# Patient Record
Sex: MALE | Employment: OTHER | URBAN - METROPOLITAN AREA
[De-identification: names, ages, dates, MRNs, and addresses within clinical notes are randomized per-mention and may not be internally consistent; named-entity substitution may affect disease eponyms.]

---

## 2021-12-19 ENCOUNTER — ANESTHESIA EVENT (OUTPATIENT)
Dept: ENDOSCOPY | Age: 76
End: 2021-12-19
Payer: MEDICARE

## 2021-12-23 ENCOUNTER — ANESTHESIA (OUTPATIENT)
Dept: ENDOSCOPY | Age: 76
End: 2021-12-23
Payer: MEDICARE

## 2021-12-23 ENCOUNTER — HOSPITAL ENCOUNTER (OUTPATIENT)
Age: 76
Setting detail: OUTPATIENT SURGERY
Discharge: HOME | End: 2021-12-23
Attending: STUDENT IN AN ORGANIZED HEALTH CARE EDUCATION/TRAINING PROGRAM | Admitting: STUDENT IN AN ORGANIZED HEALTH CARE EDUCATION/TRAINING PROGRAM
Payer: MEDICARE

## 2021-12-23 VITALS
RESPIRATION RATE: 16 BRPM | HEART RATE: 63 BPM | TEMPERATURE: 98 F | SYSTOLIC BLOOD PRESSURE: 100 MMHG | BODY MASS INDEX: 22.21 KG/M2 | OXYGEN SATURATION: 98 % | HEIGHT: 72 IN | WEIGHT: 164 LBS | DIASTOLIC BLOOD PRESSURE: 55 MMHG

## 2021-12-23 LAB
GLUCOSE BLD STRIP.AUTO-MCNC: 278 MG/DL (ref 65–100)
SERVICE CMNT-IMP: ABNORMAL

## 2021-12-23 PROCEDURE — 74011250636 HC RX REV CODE- 250/636: Performed by: ANESTHESIOLOGY

## 2021-12-23 PROCEDURE — 74011000250 HC RX REV CODE- 250: Performed by: NURSE ANESTHETIST, CERTIFIED REGISTERED

## 2021-12-23 PROCEDURE — 82962 GLUCOSE BLOOD TEST: CPT

## 2021-12-23 PROCEDURE — 2709999900 HC NON-CHARGEABLE SUPPLY: Performed by: STUDENT IN AN ORGANIZED HEALTH CARE EDUCATION/TRAINING PROGRAM

## 2021-12-23 PROCEDURE — 76040000025: Performed by: STUDENT IN AN ORGANIZED HEALTH CARE EDUCATION/TRAINING PROGRAM

## 2021-12-23 PROCEDURE — 74011250636 HC RX REV CODE- 250/636: Performed by: NURSE ANESTHETIST, CERTIFIED REGISTERED

## 2021-12-23 PROCEDURE — 76060000031 HC ANESTHESIA FIRST 0.5 HR: Performed by: STUDENT IN AN ORGANIZED HEALTH CARE EDUCATION/TRAINING PROGRAM

## 2021-12-23 RX ORDER — DIPHENHYDRAMINE HYDROCHLORIDE 50 MG/ML
12.5 INJECTION, SOLUTION INTRAMUSCULAR; INTRAVENOUS
Status: DISCONTINUED | OUTPATIENT
Start: 2021-12-23 | End: 2021-12-23 | Stop reason: HOSPADM

## 2021-12-23 RX ORDER — LIDOCAINE HYDROCHLORIDE 20 MG/ML
INJECTION, SOLUTION EPIDURAL; INFILTRATION; INTRACAUDAL; PERINEURAL AS NEEDED
Status: DISCONTINUED | OUTPATIENT
Start: 2021-12-23 | End: 2021-12-23 | Stop reason: HOSPADM

## 2021-12-23 RX ORDER — FENTANYL CITRATE 50 UG/ML
100 INJECTION, SOLUTION INTRAMUSCULAR; INTRAVENOUS ONCE
Status: DISCONTINUED | OUTPATIENT
Start: 2021-12-23 | End: 2021-12-23 | Stop reason: HOSPADM

## 2021-12-23 RX ORDER — MIDAZOLAM HYDROCHLORIDE 1 MG/ML
2 INJECTION, SOLUTION INTRAMUSCULAR; INTRAVENOUS
Status: DISCONTINUED | OUTPATIENT
Start: 2021-12-23 | End: 2021-12-23 | Stop reason: HOSPADM

## 2021-12-23 RX ORDER — NALOXONE HYDROCHLORIDE 0.4 MG/ML
0.1 INJECTION, SOLUTION INTRAMUSCULAR; INTRAVENOUS; SUBCUTANEOUS AS NEEDED
Status: DISCONTINUED | OUTPATIENT
Start: 2021-12-23 | End: 2021-12-23 | Stop reason: HOSPADM

## 2021-12-23 RX ORDER — SODIUM CHLORIDE, SODIUM LACTATE, POTASSIUM CHLORIDE, CALCIUM CHLORIDE 600; 310; 30; 20 MG/100ML; MG/100ML; MG/100ML; MG/100ML
100 INJECTION, SOLUTION INTRAVENOUS CONTINUOUS
Status: DISCONTINUED | OUTPATIENT
Start: 2021-12-23 | End: 2021-12-23 | Stop reason: HOSPADM

## 2021-12-23 RX ORDER — OXYCODONE HYDROCHLORIDE 5 MG/1
10 TABLET ORAL
Status: DISCONTINUED | OUTPATIENT
Start: 2021-12-23 | End: 2021-12-23 | Stop reason: HOSPADM

## 2021-12-23 RX ORDER — MIDAZOLAM HYDROCHLORIDE 1 MG/ML
2 INJECTION, SOLUTION INTRAMUSCULAR; INTRAVENOUS ONCE
Status: DISCONTINUED | OUTPATIENT
Start: 2021-12-23 | End: 2021-12-23 | Stop reason: HOSPADM

## 2021-12-23 RX ORDER — PROPOFOL 10 MG/ML
INJECTION, EMULSION INTRAVENOUS AS NEEDED
Status: DISCONTINUED | OUTPATIENT
Start: 2021-12-23 | End: 2021-12-23 | Stop reason: HOSPADM

## 2021-12-23 RX ORDER — ALBUTEROL SULFATE 0.83 MG/ML
2.5 SOLUTION RESPIRATORY (INHALATION) AS NEEDED
Status: DISCONTINUED | OUTPATIENT
Start: 2021-12-23 | End: 2021-12-23 | Stop reason: HOSPADM

## 2021-12-23 RX ORDER — OXYCODONE HYDROCHLORIDE 5 MG/1
5 TABLET ORAL
Status: DISCONTINUED | OUTPATIENT
Start: 2021-12-23 | End: 2021-12-23 | Stop reason: HOSPADM

## 2021-12-23 RX ORDER — HYDROMORPHONE HYDROCHLORIDE 1 MG/ML
0.5 INJECTION, SOLUTION INTRAMUSCULAR; INTRAVENOUS; SUBCUTANEOUS
Status: DISCONTINUED | OUTPATIENT
Start: 2021-12-23 | End: 2021-12-23 | Stop reason: HOSPADM

## 2021-12-23 RX ORDER — ONDANSETRON 2 MG/ML
4 INJECTION INTRAMUSCULAR; INTRAVENOUS ONCE
Status: DISCONTINUED | OUTPATIENT
Start: 2021-12-23 | End: 2021-12-23 | Stop reason: HOSPADM

## 2021-12-23 RX ORDER — LIDOCAINE HYDROCHLORIDE 10 MG/ML
0.1 INJECTION INFILTRATION; PERINEURAL AS NEEDED
Status: DISCONTINUED | OUTPATIENT
Start: 2021-12-23 | End: 2021-12-23 | Stop reason: HOSPADM

## 2021-12-23 RX ADMIN — PROPOFOL 30 MG: 10 INJECTION, EMULSION INTRAVENOUS at 12:45

## 2021-12-23 RX ADMIN — PROPOFOL 30 MG: 10 INJECTION, EMULSION INTRAVENOUS at 12:42

## 2021-12-23 RX ADMIN — LIDOCAINE HYDROCHLORIDE 100 MG: 20 INJECTION, SOLUTION EPIDURAL; INFILTRATION; INTRACAUDAL; PERINEURAL at 12:39

## 2021-12-23 RX ADMIN — SODIUM CHLORIDE, SODIUM LACTATE, POTASSIUM CHLORIDE, AND CALCIUM CHLORIDE: 600; 310; 30; 20 INJECTION, SOLUTION INTRAVENOUS at 12:30

## 2021-12-23 RX ADMIN — PROPOFOL 80 MG: 10 INJECTION, EMULSION INTRAVENOUS at 12:39

## 2021-12-23 NOTE — ANESTHESIA POSTPROCEDURE EVALUATION
Procedure(s):  ESOPHAGOGASTRODUODENOSCOPY (EGD)/ BMI 26  ESOPHAGEAL DILATION. total IV anesthesia    Anesthesia Post Evaluation      Multimodal analgesia: multimodal analgesia used between 6 hours prior to anesthesia start to PACU discharge  Patient location during evaluation: PACU  Patient participation: complete - patient participated  Level of consciousness: awake and awake and alert  Pain management: adequate  Airway patency: patent  Anesthetic complications: no  Cardiovascular status: acceptable  Respiratory status: acceptable  Hydration status: acceptable  Post anesthesia nausea and vomiting:  controlled      INITIAL Post-op Vital signs:   Vitals Value Taken Time   /55 12/23/21 1325   Temp 36.7 °C (98 °F) 12/23/21 1256   Pulse 62 12/23/21 1328   Resp 16 12/23/21 1325   SpO2 89 % 12/23/21 1327   Vitals shown include unvalidated device data.

## 2021-12-23 NOTE — PROGRESS NOTES
Discharge instructions reviewed with pt and . Opportunities for questions and clarification provided. IV removed.

## 2021-12-23 NOTE — ANESTHESIA PREPROCEDURE EVALUATION
Relevant Problems   No relevant active problems       Anesthetic History   No history of anesthetic complications            Review of Systems / Medical History  Patient summary reviewed, nursing notes reviewed and pertinent labs reviewed    Pulmonary  Within defined limits                 Neuro/Psych              Cardiovascular    Hypertension          CAD and CABG    Exercise tolerance: >4 METS  Comments: CABG 6 weeks ago   GI/Hepatic/Renal     GERD: well controlled           Endo/Other    Diabetes: poorly controlled, type 2         Other Findings              Physical Exam    Airway  Mallampati: II  TM Distance: 4 - 6 cm  Neck ROM: normal range of motion   Mouth opening: Normal     Cardiovascular  Regular rate and rhythm,  S1 and S2 normal,  no murmur, click, rub, or gallop             Dental  No notable dental hx       Pulmonary  Breath sounds clear to auscultation               Abdominal         Other Findings            Anesthetic Plan    ASA: 3  Anesthesia type: total IV anesthesia          Induction: Intravenous  Anesthetic plan and risks discussed with: Patient

## 2021-12-23 NOTE — PROCEDURES
ESOPHAGOGASTRODUODENOSCOPY    DATE of PROCEDURE:   2021    PRE-OP DIAGNOSIS:  1. Dysphagia     POST-OP DIAGNOSIS:  1. Zenker's diverticulum  2. Suspected UES stricture  3. Hiatal hernia    MEDICATIONS ADMINISTERED:   MAC per anesthesia    INSTRUMENT:   [id number]    PROCEDURE:    After obtaining informed consent, the patient was placed in the left lateral position and sedated. The endoscope was advanced to the 2nd portion of the duodenum under direct vision without difficulty. The esophagus, stomach (including retroflexed views) and duodenum were evaluated. The patient was taken to the recovery area in stable condition. FINDINGS:  ESOPHAGUS: A medium sized Zenker's diverticulum was initially encountered and proximal oropharynx. The scope was eventually able to be navigated into the proximal esophagus, with a possible stricture noted at the level of the UES. Otherwise normal mid and distal esophagus. The Z line was intact. After the remainder of hte exam was complete, a wire was then passed into the stomach and dilation performed over the guidewire using a 51 Croatian Savary dilator with minimal resistance encountered. Post dilation exam revealed moderate superficial mucosal disruption at the level of the UES. STOMACH: A 2 cm sliding-type hiatal hernia without Elijah ulcers was noted with Z line located at 40 cm and diaphragmatic hiatus 42 cm from the incisors. The flap valve is considered Hill grade II on retroflexed view. Otherwise normal gastric mucosa. DUODENUM: Normal bulb and 2nd portion.     ESTIMATED BLOOD LOSS:  Minimal.    PLAN:  - Soft diet today, regular tomorrow  - ROV with Dr. Ernie Nath in 4-6 weeks  - Repeat Savary dilation PRN Harden Apgar, MD  [facility]

## 2021-12-23 NOTE — H&P
History and Physical                          Patient:   Dileep Escobedo  YOB: 1945   Date:                        12/15/2021 9:35 AM   Visit Type:                  Chart Update  Provider:   Markel Harris MD  Primary Care Provider: Venus Bush  [facility]    This 64year old male presents for chart update. History of Present Illness:  1.  chart update   Pt.'s wife calling reporting patient having severe solid food and pill dysphagia to the point of choking. Requesting his EGD with dilation be done ASAP. Pt. has cardiac history and was seen 12/1/21 by Dr. Yohana Lopez in [place]. Dr. Isabella Prado discussed case with JORJE Stock. Per CRNA, pt should be done at hospital due to CABG 09/23/2021, pt. has persistent exertional dyspnea with hoarseness- possible diaphragmatic paralysis, vocal cord paralysis. Pt. feels like he cannot catch his breath with activity and complains of hoarse voice ever since surgery. Dr. Isabella Prado agrees and states to schedule at hospital with any MD.  Will notify [facility] of procedure MD so he can discuss prior        Past Medical/Surgical History:   (Detailed)  Disease/disorder Onset Date Management Date Comments   Colon polyps         Procedure History  Test Date Results Interp   EGD 02/05/2020 Zenkers diverticulum, Hiatal hernia, Unice Newer lesion, acute    EGD 02/05/2020 Zenkers diverticulum, Hiatal hernia, Unice Newer lesion, acute      Family History:  (Detailed)    Social History:  (Detailed)  Preferred language is English.       Current Medications:  Medication Directions   aspirin 325 mg tablet take 1 tablet by oral route  every day   losartan 50 mg tablet take 1 tablet by oral route  every day   metformin 1,000 mg tablet take 1 tablet by oral route 2 times every day with morning and evening meals   pravastatin 40 mg tablet take 1 tablet by oral route  every day   Sleep Aid (diphenhydramine) 25 mg capsule take po as needed for sleep Allergies:  Ingredient Reaction (Severity) Medication Name Comment   NO KNOWN ALLERGIES        Elements of this note may have been dictated using speech recognition software. As a result, errors of speech recognition may have occurred. Provider:   Mikey Peters 12/15/2021 9:40 AM   Document generated by: Alberto Epley. Bereket Marcus 12/15/2021    Electronically signed by Devan Heard. Joe Voss MD on 12/16/2021 02:50 PM        GENERAL: Alert, cooperative, in no acute distress  CV: Regular rate and rhythm  RESP: Clear to auscultation bilaterally anteriorly  ABD: Soft, non-tender, non-distended, normoactive bowel sounds, no organomegaly appreciated  EXTREM: Extremities normal, atraumatic, no cyanosis or edema  NEURO: Awake and alert    I discussed the technique involved with the procedure as well as the risks, benefits, and alternatives including but not limited to bleeding, infection, perforation requiring emergent surgery, missing lesions, and anesthesia related complications with the patient, who voiced understanding and agrees to proceed. Eileen Evangelista M.D. [facility]

## 2021-12-23 NOTE — DISCHARGE INSTRUCTIONS
Gastrointestinal Esophagogastroduodenoscopy (EGD) - Upper Exam Discharge Instructions    1. Call Dr. Karlo Velázquez at [phone] for any problems or questions. 2. Contact the doctor's office for follow up appointment as directed. 3. Medication may cause drowsiness for several hours, therefore:  · Do not drive or operate machinery for remainder of the day. · No alcohol today. · Do not make any important or legal decisions for 24 hours. · Do not sign any legal documents for 24 hours. 5. Ordinarily, you may resume regular diet and activity after exam unless otherwise specified by your physician. 6. For mild soreness in your throat you may use Cepacol throat lozenges or warm salt-water gargles as needed. Any additional instructions:    - Soft diet today, regular diet tomorrow. - Follow up office visit with Dr. Isabella Prado in 4-6 weeks. - Repeat Savary dilation as needed. Instructions given to [employer] and other family members.

## (undated) DEVICE — CONNECTOR TBNG OD5-7MM O2 END DISP

## (undated) DEVICE — KENDALL RADIOLUCENT FOAM MONITORING ELECTRODE RECTANGULAR SHAPE: Brand: KENDALL

## (undated) DEVICE — CANNULA NSL ORAL AD FOR CAPNOFLEX CO2 O2 AIRLFE

## (undated) DEVICE — BLOCK BITE AD 60FR W/ VELC STRP ADDRESSES MOST PT AND